# Patient Record
Sex: MALE | Race: WHITE | NOT HISPANIC OR LATINO | URBAN - METROPOLITAN AREA
[De-identification: names, ages, dates, MRNs, and addresses within clinical notes are randomized per-mention and may not be internally consistent; named-entity substitution may affect disease eponyms.]

---

## 2024-03-20 ENCOUNTER — OFFICE VISIT (OUTPATIENT)
Dept: PEDIATRICS | Facility: CLINIC | Age: 19
End: 2024-03-20
Payer: COMMERCIAL

## 2024-03-20 VITALS
HEART RATE: 92 BPM | TEMPERATURE: 98.2 F | SYSTOLIC BLOOD PRESSURE: 130 MMHG | WEIGHT: 185 LBS | DIASTOLIC BLOOD PRESSURE: 85 MMHG

## 2024-03-20 DIAGNOSIS — R68.89 SENSITIVITY TO LIGHT: Primary | ICD-10-CM

## 2024-03-20 PROBLEM — S83.209A CURRENT TEAR OF SEMILUNAR CARTILAGE: Status: ACTIVE | Noted: 2024-03-20

## 2024-03-20 PROBLEM — S92.909A FRACTURE OF FOOT: Status: ACTIVE | Noted: 2024-03-20

## 2024-03-20 PROBLEM — Z22.321 SUSPECTED CARRIER OF METHICILLIN SUSCEPTIBLE STAPHYLOCOCCUS AUREUS (MSSA): Status: ACTIVE | Noted: 2024-03-20

## 2024-03-20 PROBLEM — S89.90XA INJURY OF ANTERIOR CRUCIATE LIGAMENT: Status: ACTIVE | Noted: 2024-03-20

## 2024-03-20 PROCEDURE — 1036F TOBACCO NON-USER: CPT | Performed by: PEDIATRICS

## 2024-03-20 PROCEDURE — 99213 OFFICE O/P EST LOW 20 MIN: CPT | Performed by: PEDIATRICS

## 2024-03-20 NOTE — PROGRESS NOTES
Subjective   Zenon Larose a 18 y.o.malewho presents forEye Problem (Patient is 18 yrs old here because has been having light sensitivity for weeks now)  HPI    Having light sensitivity at work- needs a note for his car for this.   Ok in our office, worse if things are shining off of things.    Objective   /85   Pulse 92   Temp 36.8 °C (98.2 °F)   Wt 83.9 kg (185 lb)       Physical Exam      General: Well-developed, well-nourished, alert and oriented, no acute distress  Eyes: Normal sclera, PERRLA, EOMI  ENT: Moist mucous membranes,  normal throat, no nasal discharge. TMs are normal.  Cardiac:  Normal S1/S2, no murmurs, regular rhythm. Capillary refill less than 2 seconds  Pulmonary: Clear to auscultation bilaterally, no work of breathing.  GI: normal abdomen without localization and without rebound or guarding.  Skin: No rashes  Neuro: Symmetric face, no ataxia, grossly normal strength.  Lymph: No lymphadenopathy       No visits with results within 10 Day(s) from this visit.   Latest known visit with results is:   [facility] Encounter on 11/21/2020   Component Date Value Ref Range Status    SARS-CoV-2 Result 11/21/2020 NOT DETECTED  Not Detected Final    First Test? 11/21/2020 Yes   Final    Employed In Healthcare? 11/21/2020 No   Final     Employee? 11/21/2020 No   Final    Symptomatic? 11/21/2020 No   Final    Hospitalized? 11/21/2020 Unknown   Final    Icu? 11/21/2020 Unknown   Final    Congregate Care Setting? 11/21/2020 Unknown   Final    Required for procedure/surgery? 11/21/2020 Yes   Final    Date of procedure? 11/21/2020 20,201,123   Final         Assessment/Plan   Diagnoses and all orders for this visit:  Sensitivity to light  No intervention necessary

## 2024-03-20 NOTE — LETTER
March 20, 2024     Patient: Zenon Bailon   YOB: 2005   Date of Visit: 3/20/2024       To Whom It May Concern:    Zenon Bailon was seen in my clinic on 3/20/2024 at 4:30 pm. Zenon is sensitive to light and needs various accommodations to help with this. Please allow them and thanks for your understanding.    If you have any questions or concerns, please don't hesitate to call.         Sincerely,         Jesse Oden MD        CC: No Recipients